# Patient Record
Sex: FEMALE | NOT HISPANIC OR LATINO | URBAN - METROPOLITAN AREA
[De-identification: names, ages, dates, MRNs, and addresses within clinical notes are randomized per-mention and may not be internally consistent; named-entity substitution may affect disease eponyms.]

---

## 2018-01-23 ENCOUNTER — INPATIENT (INPATIENT)
Facility: HOSPITAL | Age: 73
LOS: 3 days | Discharge: HOME | DRG: 194 | End: 2018-01-27
Attending: FAMILY MEDICINE | Admitting: FAMILY MEDICINE
Payer: MEDICAID

## 2018-01-23 VITALS
HEIGHT: 63.5 IN | WEIGHT: 88.63 LBS | RESPIRATION RATE: 18 BRPM | TEMPERATURE: 101 F | DIASTOLIC BLOOD PRESSURE: 51 MMHG | SYSTOLIC BLOOD PRESSURE: 131 MMHG | OXYGEN SATURATION: 93 % | HEART RATE: 80 BPM

## 2018-01-23 DIAGNOSIS — N17.9 ACUTE KIDNEY FAILURE, UNSPECIFIED: ICD-10-CM

## 2018-01-23 DIAGNOSIS — J43.9 EMPHYSEMA, UNSPECIFIED: ICD-10-CM

## 2018-01-23 DIAGNOSIS — R41.82 ALTERED MENTAL STATUS, UNSPECIFIED: ICD-10-CM

## 2018-01-23 DIAGNOSIS — F32.9 MAJOR DEPRESSIVE DISORDER, SINGLE EPISODE, UNSPECIFIED: ICD-10-CM

## 2018-01-23 DIAGNOSIS — R50.9 FEVER, UNSPECIFIED: ICD-10-CM

## 2018-01-23 DIAGNOSIS — J10.1 INFLUENZA DUE TO OTHER IDENTIFIED INFLUENZA VIRUS WITH OTHER RESPIRATORY MANIFESTATIONS: ICD-10-CM

## 2018-01-23 DIAGNOSIS — R91.1 SOLITARY PULMONARY NODULE: ICD-10-CM

## 2018-01-23 DIAGNOSIS — I10 ESSENTIAL (PRIMARY) HYPERTENSION: ICD-10-CM
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
## 2018-01-23 DIAGNOSIS — R54 AGE-RELATED PHYSICAL DEBILITY: ICD-10-CM

## 2018-01-23 DIAGNOSIS — A41.9 SEPSIS, UNSPECIFIED ORGANISM: ICD-10-CM

## 2018-01-23 LAB
ALBUMIN SERPL ELPH-MCNC: 2.8 G/DL — LOW (ref 3.3–5)
ALP SERPL-CCNC: 36 U/L — SIGNIFICANT CHANGE UP (ref 30–120)
ALT FLD-CCNC: 46 U/L DA — SIGNIFICANT CHANGE UP (ref 10–60)
ANION GAP SERPL CALC-SCNC: 6 MMOL/L — SIGNIFICANT CHANGE UP (ref 5–17)
APPEARANCE UR: CLEAR — SIGNIFICANT CHANGE UP
AST SERPL-CCNC: 74 U/L — HIGH (ref 10–40)
BASOPHILS # BLD AUTO: 0 K/UL — SIGNIFICANT CHANGE UP (ref 0–0.2)
BASOPHILS NFR BLD AUTO: 0.3 % — SIGNIFICANT CHANGE UP (ref 0–2)
BILIRUB SERPL-MCNC: 0.4 MG/DL — SIGNIFICANT CHANGE UP (ref 0.2–1.2)
BILIRUB UR-MCNC: NEGATIVE — SIGNIFICANT CHANGE UP
BUN SERPL-MCNC: 28 MG/DL — HIGH (ref 7–23)
CALCIUM SERPL-MCNC: 8.2 MG/DL — LOW (ref 8.4–10.5)
CHLORIDE SERPL-SCNC: 104 MMOL/L — SIGNIFICANT CHANGE UP (ref 96–108)
CO2 SERPL-SCNC: 30 MMOL/L — SIGNIFICANT CHANGE UP (ref 22–31)
COLOR SPEC: YELLOW — SIGNIFICANT CHANGE UP
CREAT SERPL-MCNC: 1.36 MG/DL — HIGH (ref 0.5–1.3)
DIFF PNL FLD: ABNORMAL
EOSINOPHIL # BLD AUTO: 0 K/UL — SIGNIFICANT CHANGE UP (ref 0–0.5)
EOSINOPHIL NFR BLD AUTO: 0 % — SIGNIFICANT CHANGE UP (ref 0–6)
FLUAV SPEC QL CULT: NEGATIVE — SIGNIFICANT CHANGE UP
FLUBV AG SPEC QL IA: NEGATIVE — SIGNIFICANT CHANGE UP
GLUCOSE SERPL-MCNC: 141 MG/DL — HIGH (ref 70–99)
GLUCOSE UR QL: NEGATIVE MG/DL — SIGNIFICANT CHANGE UP
HCT VFR BLD CALC: 32.7 % — LOW (ref 34.5–45)
HGB BLD-MCNC: 11 G/DL — LOW (ref 11.5–15.5)
KETONES UR-MCNC: NEGATIVE — SIGNIFICANT CHANGE UP
LACTATE SERPL-SCNC: 1 MMOL/L — SIGNIFICANT CHANGE UP (ref 0.7–2)
LACTATE SERPL-SCNC: 2.5 MMOL/L — HIGH (ref 0.7–2)
LEUKOCYTE ESTERASE UR-ACNC: NEGATIVE — SIGNIFICANT CHANGE UP
LYMPHOCYTES # BLD AUTO: 0.9 K/UL — LOW (ref 1–3.3)
LYMPHOCYTES # BLD AUTO: 7.3 % — LOW (ref 13–44)
MCHC RBC-ENTMCNC: 31.4 PG — SIGNIFICANT CHANGE UP (ref 27–34)
MCHC RBC-ENTMCNC: 33.7 GM/DL — SIGNIFICANT CHANGE UP (ref 32–36)
MCV RBC AUTO: 93.2 FL — SIGNIFICANT CHANGE UP (ref 80–100)
MONOCYTES # BLD AUTO: 0.6 K/UL — SIGNIFICANT CHANGE UP (ref 0–0.9)
MONOCYTES NFR BLD AUTO: 4.6 % — SIGNIFICANT CHANGE UP (ref 2–14)
NEUTROPHILS # BLD AUTO: 10.6 K/UL — HIGH (ref 1.8–7.4)
NEUTROPHILS NFR BLD AUTO: 87.8 % — HIGH (ref 43–77)
NITRITE UR-MCNC: NEGATIVE — SIGNIFICANT CHANGE UP
PH UR: 5 — SIGNIFICANT CHANGE UP (ref 5–8)
PLATELET # BLD AUTO: 151 K/UL — SIGNIFICANT CHANGE UP (ref 150–400)
POTASSIUM SERPL-MCNC: 4.1 MMOL/L — SIGNIFICANT CHANGE UP (ref 3.5–5.3)
POTASSIUM SERPL-SCNC: 4.1 MMOL/L — SIGNIFICANT CHANGE UP (ref 3.5–5.3)
PROT SERPL-MCNC: 7.2 G/DL — SIGNIFICANT CHANGE UP (ref 6–8.3)
PROT UR-MCNC: 100 MG/DL
RBC # BLD: 3.5 M/UL — LOW (ref 3.8–5.2)
RBC # FLD: 11.8 % — SIGNIFICANT CHANGE UP (ref 10.3–14.5)
SODIUM SERPL-SCNC: 140 MMOL/L — SIGNIFICANT CHANGE UP (ref 135–145)
SP GR SPEC: 1.02 — SIGNIFICANT CHANGE UP (ref 1.01–1.02)
UROBILINOGEN FLD QL: NEGATIVE MG/DL — SIGNIFICANT CHANGE UP
WBC # BLD: 12 K/UL — HIGH (ref 3.8–10.5)
WBC # FLD AUTO: 12 K/UL — HIGH (ref 3.8–10.5)

## 2018-01-23 PROCEDURE — 93010 ELECTROCARDIOGRAM REPORT: CPT

## 2018-01-23 PROCEDURE — 99223 1ST HOSP IP/OBS HIGH 75: CPT

## 2018-01-23 PROCEDURE — 70450 CT HEAD/BRAIN W/O DYE: CPT | Mod: 26

## 2018-01-23 PROCEDURE — 71045 X-RAY EXAM CHEST 1 VIEW: CPT | Mod: 26

## 2018-01-23 PROCEDURE — 99285 EMERGENCY DEPT VISIT HI MDM: CPT

## 2018-01-23 PROCEDURE — 71250 CT THORAX DX C-: CPT | Mod: 26

## 2018-01-23 RX ORDER — ATENOLOL 25 MG/1
50 TABLET ORAL AT BEDTIME
Qty: 0 | Refills: 0 | Status: DISCONTINUED | OUTPATIENT
Start: 2018-01-23 | End: 2018-01-27

## 2018-01-23 RX ORDER — FOLIC ACID 0.8 MG
1 TABLET ORAL DAILY
Qty: 0 | Refills: 0 | Status: DISCONTINUED | OUTPATIENT
Start: 2018-01-23 | End: 2018-01-27

## 2018-01-23 RX ORDER — DOCUSATE SODIUM 100 MG
100 CAPSULE ORAL THREE TIMES A DAY
Qty: 0 | Refills: 0 | Status: DISCONTINUED | OUTPATIENT
Start: 2018-01-23 | End: 2018-01-27

## 2018-01-23 RX ORDER — MIRTAZAPINE 45 MG/1
22.5 TABLET, ORALLY DISINTEGRATING ORAL
Qty: 0 | Refills: 0 | COMMUNITY

## 2018-01-23 RX ORDER — ONDANSETRON 8 MG/1
4 TABLET, FILM COATED ORAL EVERY 6 HOURS
Qty: 0 | Refills: 0 | Status: DISCONTINUED | OUTPATIENT
Start: 2018-01-23 | End: 2018-01-27

## 2018-01-23 RX ORDER — ESCITALOPRAM OXALATE 10 MG/1
5 TABLET, FILM COATED ORAL DAILY
Qty: 0 | Refills: 0 | Status: DISCONTINUED | OUTPATIENT
Start: 2018-01-23 | End: 2018-01-23

## 2018-01-23 RX ORDER — ACETAMINOPHEN 500 MG
650 TABLET ORAL ONCE
Qty: 0 | Refills: 0 | Status: COMPLETED | OUTPATIENT
Start: 2018-01-23 | End: 2018-01-23

## 2018-01-23 RX ORDER — SODIUM CHLORIDE 9 MG/ML
1000 INJECTION INTRAMUSCULAR; INTRAVENOUS; SUBCUTANEOUS ONCE
Qty: 0 | Refills: 0 | Status: COMPLETED | OUTPATIENT
Start: 2018-01-23 | End: 2018-01-23

## 2018-01-23 RX ORDER — SODIUM CHLORIDE 9 MG/ML
1000 INJECTION, SOLUTION INTRAVENOUS
Qty: 0 | Refills: 0 | Status: DISCONTINUED | OUTPATIENT
Start: 2018-01-23 | End: 2018-01-24

## 2018-01-23 RX ORDER — MIRTAZAPINE 45 MG/1
7.5 TABLET, ORALLY DISINTEGRATING ORAL AT BEDTIME
Qty: 0 | Refills: 0 | Status: DISCONTINUED | OUTPATIENT
Start: 2018-01-23 | End: 2018-01-23

## 2018-01-23 RX ORDER — ESCITALOPRAM OXALATE 10 MG/1
15 TABLET, FILM COATED ORAL DAILY
Qty: 0 | Refills: 0 | Status: DISCONTINUED | OUTPATIENT
Start: 2018-01-23 | End: 2018-01-27

## 2018-01-23 RX ORDER — MIRTAZAPINE 45 MG/1
22.5 TABLET, ORALLY DISINTEGRATING ORAL AT BEDTIME
Qty: 0 | Refills: 0 | Status: DISCONTINUED | OUTPATIENT
Start: 2018-01-23 | End: 2018-01-27

## 2018-01-23 RX ORDER — SODIUM CHLORIDE 9 MG/ML
1000 INJECTION INTRAMUSCULAR; INTRAVENOUS; SUBCUTANEOUS
Qty: 0 | Refills: 0 | Status: DISCONTINUED | OUTPATIENT
Start: 2018-01-23 | End: 2018-01-23

## 2018-01-23 RX ORDER — LOSARTAN POTASSIUM 100 MG/1
1 TABLET, FILM COATED ORAL
Qty: 0 | Refills: 0 | COMMUNITY

## 2018-01-23 RX ORDER — IPRATROPIUM/ALBUTEROL SULFATE 18-103MCG
3 AEROSOL WITH ADAPTER (GRAM) INHALATION EVERY 6 HOURS
Qty: 0 | Refills: 0 | Status: DISCONTINUED | OUTPATIENT
Start: 2018-01-23 | End: 2018-01-23

## 2018-01-23 RX ORDER — FOLIC ACID 0.8 MG
1 TABLET ORAL
Qty: 0 | Refills: 0 | COMMUNITY

## 2018-01-23 RX ORDER — ALBUTEROL 90 UG/1
2.5 AEROSOL, METERED ORAL EVERY 12 HOURS
Qty: 0 | Refills: 0 | Status: DISCONTINUED | OUTPATIENT
Start: 2018-01-23 | End: 2018-01-27

## 2018-01-23 RX ORDER — INFLUENZA VIRUS VACCINE 15; 15; 15; 15 UG/.5ML; UG/.5ML; UG/.5ML; UG/.5ML
0.5 SUSPENSION INTRAMUSCULAR ONCE
Qty: 0 | Refills: 0 | Status: COMPLETED | OUTPATIENT
Start: 2018-01-23 | End: 2018-01-23

## 2018-01-23 RX ORDER — ATENOLOL 25 MG/1
1 TABLET ORAL
Qty: 0 | Refills: 0 | COMMUNITY

## 2018-01-23 RX ORDER — LACTOBACILLUS ACIDOPHILUS 100MM CELL
1 CAPSULE ORAL
Qty: 0 | Refills: 0 | Status: DISCONTINUED | OUTPATIENT
Start: 2018-01-23 | End: 2018-01-27

## 2018-01-23 RX ORDER — ESCITALOPRAM OXALATE 10 MG/1
15 TABLET, FILM COATED ORAL
Qty: 0 | Refills: 0 | COMMUNITY

## 2018-01-23 RX ORDER — LOSARTAN POTASSIUM 100 MG/1
25 TABLET, FILM COATED ORAL DAILY
Qty: 0 | Refills: 0 | Status: DISCONTINUED | OUTPATIENT
Start: 2018-01-23 | End: 2018-01-27

## 2018-01-23 RX ORDER — HEPARIN SODIUM 5000 [USP'U]/ML
5000 INJECTION INTRAVENOUS; SUBCUTANEOUS EVERY 12 HOURS
Qty: 0 | Refills: 0 | Status: DISCONTINUED | OUTPATIENT
Start: 2018-01-23 | End: 2018-01-27

## 2018-01-23 RX ADMIN — Medication 600 MILLIGRAM(S): at 18:52

## 2018-01-23 RX ADMIN — ATENOLOL 50 MILLIGRAM(S): 25 TABLET ORAL at 21:01

## 2018-01-23 RX ADMIN — Medication 650 MILLIGRAM(S): at 13:48

## 2018-01-23 RX ADMIN — SODIUM CHLORIDE 1000 MILLILITER(S): 9 INJECTION INTRAMUSCULAR; INTRAVENOUS; SUBCUTANEOUS at 14:31

## 2018-01-23 RX ADMIN — SODIUM CHLORIDE 1000 MILLILITER(S): 9 INJECTION INTRAMUSCULAR; INTRAVENOUS; SUBCUTANEOUS at 13:56

## 2018-01-23 RX ADMIN — HEPARIN SODIUM 5000 UNIT(S): 5000 INJECTION INTRAVENOUS; SUBCUTANEOUS at 18:52

## 2018-01-23 RX ADMIN — SODIUM CHLORIDE 1000 MILLILITER(S): 9 INJECTION INTRAMUSCULAR; INTRAVENOUS; SUBCUTANEOUS at 13:39

## 2018-01-23 RX ADMIN — ALBUTEROL 2.5 MILLIGRAM(S): 90 AEROSOL, METERED ORAL at 19:45

## 2018-01-23 RX ADMIN — MIRTAZAPINE 22.5 MILLIGRAM(S): 45 TABLET, ORALLY DISINTEGRATING ORAL at 21:31

## 2018-01-23 RX ADMIN — SODIUM CHLORIDE 1000 MILLILITER(S): 9 INJECTION INTRAMUSCULAR; INTRAVENOUS; SUBCUTANEOUS at 13:41

## 2018-01-23 RX ADMIN — SODIUM CHLORIDE 80 MILLILITER(S): 9 INJECTION, SOLUTION INTRAVENOUS at 16:58

## 2018-01-23 RX ADMIN — Medication 30 MILLIGRAM(S): at 18:52

## 2018-01-23 NOTE — ED PROVIDER NOTE - CHPI ED SYMPTOMS NEG
no confusion/no loss of consciousness/no tingling/no fever/no numbness/no weakness/no vomiting/no deformity/no bleeding

## 2018-01-23 NOTE — CONSULT NOTE ADULT - PROBLEM SELECTOR RECOMMENDATION 2
pulm Nodule  suspicious for malignancy   will check CEA  may need PET scan and or repeat CT scan as outpatient  age appropriate cancer screen

## 2018-01-23 NOTE — ED PROVIDER NOTE - OBJECTIVE STATEMENT
72 year old female with a PMHx of depression and anxiety c/o fall, weakness x 4 days.  Daughter states she fell 4 days ago and today.  PT is weak and unstable on her feet.  She hit her head both times, small abrasion to her scalp.  Family denies LOC.  Pt has been coughing for the past week, daughter states the whole family has been sick with an URI.  PT had subjective fevers at home and chills.  Denies n/v/d/c, SOB, abdominal pain, extremity pain, chest pain, recent travel.  PMD: Dr. Vera

## 2018-01-23 NOTE — ED PROVIDER NOTE - MEDICAL DECISION MAKING DETAILS
pt with weakness mult falls past 4 days, fever, mult family members with uri's - ekg/xr/labs/ct/ivf/abx/tylenol pt with weakness mult falls past 4 days, fever, mult family members with uri's - ekg/xr/labs/ct/ivf/abx/tylenol  admit to hospitalist for sepsis

## 2018-01-23 NOTE — H&P ADULT - PROBLEM SELECTOR PLAN 1
Unclear Etiology.  Possible URI.  Will obtain BC/UA/UC, chest CT scan, and Monitor Clinical Pic.  Might consider ID consult

## 2018-01-23 NOTE — H&P ADULT - RS GEN PE MLT RESP DETAILS PC
diminished breath sounds, R/good air movement/breath sounds equal/rhonchi/airway patent/diminished breath sounds, L

## 2018-01-23 NOTE — SWALLOW BEDSIDE ASSESSMENT ADULT - COMMENTS
The patient is a 72 year old female, A&Ox1, and currently NPO. The patients daughter was present and expressed that the patient is tolerating a soft textured diet with thin liquids at home. She also noted that the patient has had a change in speech since her fall, specifically in production of words. During this evaluation, the patient trialed puree, soft, and regular solids with thin liquids. Adequate oral prep and A-P transport, timely swallow trigger with hyolaryngeal excursion. No overt s/s penetration/aspiration noted. Patients daughter requesting Dysphagia 3 Soft textures, per patient preference.

## 2018-01-23 NOTE — H&P ADULT - HISTORY OF PRESENT ILLNESS
Patient is 73 yo female with hx of depression, and HTN presenting with productive cough with green sputum, mild sob, and URI symptoms that has been going on for the past several days, and progressively worsen.  As per Aid, patient fell on Friday when she was getting off the bed, and Yesterday after having BM.  No LOC.    [place], but unable to obtain medical care since then due to insurance Issues.  As per family, patient has been having issues with memory issue, abnormal gait, and weight lost that has been going on for a while    Patient appears to be confused.  Unable to obtain HX or ROS

## 2018-01-23 NOTE — H&P ADULT - ATTENDING COMMENTS
Plan of care was discussed with patient, and daughter in great details, All questions were answered to their satisfaction.  Seems to understand, and in agreement

## 2018-01-23 NOTE — ED PROVIDER NOTE - PROGRESS NOTE DETAILS
Pt is being admitted for sepsis, discussed with the family.  Spoke to Dr. Reed, will admit to hospitalist.

## 2018-01-23 NOTE — ED ADULT NURSE NOTE - CHIEF COMPLAINT QUOTE
"She fell 4 days ago and again last night." Low back pain and hit her head last night. Brought in by daughter. Patient also has chest congestion for a week dn feels weak.

## 2018-01-23 NOTE — CONSULT NOTE ADULT - PROBLEM SELECTOR RECOMMENDATION 5
COPD  emphysema on CT chest  NEBS bid  mucinex  Tamiflu for FLU illness  prognosis guarded  keep sat > 88 pct  will follow

## 2018-01-23 NOTE — H&P ADULT - PROBLEM SELECTOR PLAN 4
unclear Etiology.  Head CT scan shows no acute process.  Will obtain UA/UC/Chest CT scan to rule out infection.  Will also obtain TSH level, B12, folate level, and neurology consult.  Continue with neuro check  NPO until further evaluation by Speech therapy unclear Etiology.  Head CT scan shows no acute process.  Will obtain UA/UC/Chest CT scan to rule out infection.  Will also obtain TSH level, RPR, B12, folate level, and neurology consult.  Continue with neuro check  NPO until further evaluation by Speech therapy

## 2018-01-23 NOTE — ED PROVIDER NOTE - PHYSICAL EXAMINATION
NO ABRASION NOTED TO THE SCALP  ABRASION TO IVÁN ELBOWS   SMALL AREA OF ECCHYMOSES TO THE RIGHT HELIX

## 2018-01-23 NOTE — ED ADULT NURSE NOTE - CHPI ED SYMPTOMS NEG
no chest pain/no diaphoresis/no chills/no body aches/no headache/no hemoptysis/no shortness of breath/no wheezing/no edema

## 2018-01-23 NOTE — ED ADULT TRIAGE NOTE - CHIEF COMPLAINT QUOTE
"She fell 4 days ago and again last night." Low back pain and hit her head last night. Brought in by daughter. Patient also has chest congestion. "She fell 4 days ago and again last night." Low back pain and hit her head last night. Brought in by daughter. Patient also has chest congestion for a weeka dn feels weak.

## 2018-01-23 NOTE — ED PROVIDER NOTE - ATTENDING CONTRIBUTION TO CARE
pt c/o 4 days of generalized weakness, mult falls, hit head. no loc, neck or back pain, arm or leg pain, cp, sob, abd pain, weakness, numbness. + cough.  mcat, perrl, mmm, s1s2 rrr, lungs cta b/l, abd soft, nt, neck and back nt, ext nt, full rom, no focal motor or sensory deficit

## 2018-01-24 DIAGNOSIS — R91.1 SOLITARY PULMONARY NODULE: ICD-10-CM

## 2018-01-24 LAB
ANION GAP SERPL CALC-SCNC: 8 MMOL/L — SIGNIFICANT CHANGE UP (ref 5–17)
BUN SERPL-MCNC: 14 MG/DL — SIGNIFICANT CHANGE UP (ref 7–23)
CALCIUM SERPL-MCNC: 7.6 MG/DL — LOW (ref 8.4–10.5)
CEA SERPL-MCNC: 2.8 NG/ML — SIGNIFICANT CHANGE UP (ref 0–3.8)
CHLORIDE SERPL-SCNC: 105 MMOL/L — SIGNIFICANT CHANGE UP (ref 96–108)
CO2 SERPL-SCNC: 25 MMOL/L — SIGNIFICANT CHANGE UP (ref 22–31)
CREAT SERPL-MCNC: 0.91 MG/DL — SIGNIFICANT CHANGE UP (ref 0.5–1.3)
FOLATE SERPL-MCNC: >20 NG/ML — SIGNIFICANT CHANGE UP (ref 4.8–24.2)
GLUCOSE SERPL-MCNC: 133 MG/DL — HIGH (ref 70–99)
HCT VFR BLD CALC: 31.4 % — LOW (ref 34.5–45)
HGB BLD-MCNC: 10.7 G/DL — LOW (ref 11.5–15.5)
LDH SERPL L TO P-CCNC: 620 U/L — HIGH (ref 50–242)
MCHC RBC-ENTMCNC: 31.7 PG — SIGNIFICANT CHANGE UP (ref 27–34)
MCHC RBC-ENTMCNC: 34.2 GM/DL — SIGNIFICANT CHANGE UP (ref 32–36)
MCV RBC AUTO: 92.8 FL — SIGNIFICANT CHANGE UP (ref 80–100)
PLATELET # BLD AUTO: 141 K/UL — LOW (ref 150–400)
POTASSIUM SERPL-MCNC: 3.8 MMOL/L — SIGNIFICANT CHANGE UP (ref 3.5–5.3)
POTASSIUM SERPL-SCNC: 3.8 MMOL/L — SIGNIFICANT CHANGE UP (ref 3.5–5.3)
RBC # BLD: 3.38 M/UL — LOW (ref 3.8–5.2)
RBC # FLD: 11.7 % — SIGNIFICANT CHANGE UP (ref 10.3–14.5)
SODIUM SERPL-SCNC: 138 MMOL/L — SIGNIFICANT CHANGE UP (ref 135–145)
T PALLIDUM AB TITR SER: NEGATIVE — SIGNIFICANT CHANGE UP
TSH SERPL-MCNC: 2.33 UIU/ML — SIGNIFICANT CHANGE UP (ref 0.27–4.2)
VIT B12 SERPL-MCNC: 1415 PG/ML — HIGH (ref 232–1245)
WBC # BLD: 10.4 K/UL — SIGNIFICANT CHANGE UP (ref 3.8–10.5)
WBC # FLD AUTO: 10.4 K/UL — SIGNIFICANT CHANGE UP (ref 3.8–10.5)

## 2018-01-24 PROCEDURE — 99233 SBSQ HOSP IP/OBS HIGH 50: CPT

## 2018-01-24 RX ORDER — ACETAMINOPHEN 500 MG
650 TABLET ORAL EVERY 6 HOURS
Qty: 0 | Refills: 0 | Status: COMPLETED | OUTPATIENT
Start: 2018-01-24 | End: 2018-01-24

## 2018-01-24 RX ORDER — AMLODIPINE BESYLATE 2.5 MG/1
5 TABLET ORAL ONCE
Qty: 0 | Refills: 0 | Status: COMPLETED | OUTPATIENT
Start: 2018-01-24 | End: 2018-01-24

## 2018-01-24 RX ORDER — TAMSULOSIN HYDROCHLORIDE 0.4 MG/1
0.4 CAPSULE ORAL AT BEDTIME
Qty: 0 | Refills: 0 | Status: DISCONTINUED | OUTPATIENT
Start: 2018-01-24 | End: 2018-01-27

## 2018-01-24 RX ORDER — POLYETHYLENE GLYCOL 3350 17 G/17G
17 POWDER, FOR SOLUTION ORAL DAILY
Qty: 0 | Refills: 0 | Status: DISCONTINUED | OUTPATIENT
Start: 2018-01-24 | End: 2018-01-27

## 2018-01-24 RX ADMIN — Medication 650 MILLIGRAM(S): at 16:59

## 2018-01-24 RX ADMIN — Medication 1 TABLET(S): at 08:15

## 2018-01-24 RX ADMIN — TAMSULOSIN HYDROCHLORIDE 0.4 MILLIGRAM(S): 0.4 CAPSULE ORAL at 18:02

## 2018-01-24 RX ADMIN — Medication 1 TABLET(S): at 18:02

## 2018-01-24 RX ADMIN — ALBUTEROL 2.5 MILLIGRAM(S): 90 AEROSOL, METERED ORAL at 19:31

## 2018-01-24 RX ADMIN — HEPARIN SODIUM 5000 UNIT(S): 5000 INJECTION INTRAVENOUS; SUBCUTANEOUS at 18:03

## 2018-01-24 RX ADMIN — MIRTAZAPINE 22.5 MILLIGRAM(S): 45 TABLET, ORALLY DISINTEGRATING ORAL at 18:02

## 2018-01-24 RX ADMIN — Medication 1 MILLIGRAM(S): at 11:45

## 2018-01-24 RX ADMIN — ATENOLOL 50 MILLIGRAM(S): 25 TABLET ORAL at 18:02

## 2018-01-24 RX ADMIN — ALBUTEROL 2.5 MILLIGRAM(S): 90 AEROSOL, METERED ORAL at 07:46

## 2018-01-24 RX ADMIN — Medication 600 MILLIGRAM(S): at 08:15

## 2018-01-24 RX ADMIN — LOSARTAN POTASSIUM 25 MILLIGRAM(S): 100 TABLET, FILM COATED ORAL at 05:29

## 2018-01-24 RX ADMIN — Medication 30 MILLIGRAM(S): at 18:02

## 2018-01-24 RX ADMIN — HEPARIN SODIUM 5000 UNIT(S): 5000 INJECTION INTRAVENOUS; SUBCUTANEOUS at 05:29

## 2018-01-24 RX ADMIN — ESCITALOPRAM OXALATE 15 MILLIGRAM(S): 10 TABLET, FILM COATED ORAL at 08:15

## 2018-01-24 RX ADMIN — Medication 600 MILLIGRAM(S): at 18:02

## 2018-01-24 RX ADMIN — Medication 1 TABLET(S): at 11:45

## 2018-01-24 RX ADMIN — Medication 30 MILLIGRAM(S): at 08:15

## 2018-01-24 NOTE — GOALS OF CARE CONVERSATION - PERSONAL ADVANCE DIRECTIVE - CONVERSATION DETAILS
Spoke with dtr /surrogate about resuscitation.She has no previous information regarding this issue.She will make those determinations as her condition warrants.

## 2018-01-24 NOTE — CONSULT NOTE ADULT - ASSESSMENT
fall mechanical   memory issues possible mci / slight dementia check tsh b12 folate  dysarthria decrease left NLF check mri   if mri normal cleared by neurology

## 2018-01-24 NOTE — DIETITIAN INITIAL EVALUATION ADULT. - PERTINENT MEDS FT
heparin, tamiflu, polyethylene glycol, colace, flomax, remeron, cozaar, lexapro, mucinex, atenolol, folic acid, zofran

## 2018-01-24 NOTE — CONSULT NOTE ADULT - SUBJECTIVE AND OBJECTIVE BOX
Date/Time Patient Seen:  		  Referring MD:   Data Reviewed	       Patient is a 72y old  Female who presents with a chief complaint of Fall/cough (23 Jan 2018 15:42)      Subjective/HPI    in bed  seen and examined  vs and meds reviewed  pt is a poor historian  discussed hx with aide and reviewed H and P and ER provider note  lives at home  positive sick contacts reported    Patient is 71 yo female with hx of depression, and HTN presenting with productive cough with green sputum, mild sob, and URI symptoms that has been going on for the past several days, and progressively worsen.  As per Aid, patient fell on Friday when she was getting off the bed, and Yesterday after having BM.  No LOC.    [place], but unable to obtain medical care since then due to insurance Issues.  As per family, patient has been having issues with memory issue, abnormal gait, and weight lost that has been going on for a while    Patient appears to be confused.  Unable to obtain HX or ROS     PAST MEDICAL & SURGICAL HISTORY:  Essential hypertension  Anxiety  Depression  No significant past surgical history        Medication list         MEDICATIONS  (STANDING):  ALBUTerol    0.083% 2.5 milliGRAM(s) Nebulizer every 12 hours  ATENolol  Tablet 50 milliGRAM(s) Oral at bedtime  dextrose 5% + sodium chloride 0.45%. 1000 milliLiter(s) (80 mL/Hr) IV Continuous <Continuous>  docusate sodium 100 milliGRAM(s) Oral three times a day  escitalopram 5 milliGRAM(s) Oral daily  folic acid 1 milliGRAM(s) Oral daily  guaiFENesin  milliGRAM(s) Oral every 12 hours  heparin  Injectable 5000 Unit(s) SubCutaneous every 12 hours  mirtazapine Soltab 7.5 milliGRAM(s) Oral at bedtime  oseltamivir 30 milliGRAM(s) Oral two times a day    MEDICATIONS  (PRN):  ondansetron Injectable 4 milliGRAM(s) IV Push every 6 hours PRN Nausea         Vitals log        ICU Vital Signs Last 24 Hrs  T(C): 38 (23 Jan 2018 17:19), Max: 38.4 (23 Jan 2018 12:15)  T(F): 100.4 (23 Jan 2018 17:19), Max: 101.1 (23 Jan 2018 12:15)  HR: 77 (23 Jan 2018 17:19) (77 - 82)  BP: 142/60 (23 Jan 2018 17:19) (130/55 - 142/60)  BP(mean): 77 (23 Jan 2018 15:42) (77 - 77)  ABP: --  ABP(mean): --  RR: 17 (23 Jan 2018 17:19) (17 - 18)  SpO2: 95% (23 Jan 2018 17:19) (93% - 95%)           Input and Output:  I&O's Detail      Lab Data                        11.0   12.0  )-----------( 151      ( 23 Jan 2018 13:30 )             32.7     01-23    140  |  104  |  28<H>  ----------------------------<  141<H>  4.1   |  30  |  1.36<H>    Ca    8.2<L>      23 Jan 2018 13:30    TPro  7.2  /  Alb  2.8<L>  /  TBili  0.4  /  DBili  x   /  AST  74<H>  /  ALT  46  /  AlkPhos  36  01-23            Review of Systems	  weak  depressed  flat affect      Objective     Physical Examination  frail  weak  head at  heart s1s2  lungs dec BS  abd soft        Pertinent Lab findings & Imaging      Olmos:  NO   Adequate UO     I&O's Detail           Discussed with:     Cultures:	        Radiology          EXAM:  CT CHEST                                  PROCEDURE DATE:  01/23/2018          INTERPRETATION:  No prior studies present for comparison    Clinical information: Fever and cough    Axial images obtained, coronal and sagittal images computer reformatted.   Noncontrast study limits evaluation of hilar and mediastinal regions.    2.2 cm mass left upper lobe , medially situated, cannot exclude neoplasm.  Advise follow-up evaluation. Consider follow-up PET scan.    12 mm nodule is present in the right upper lobe, cannot exclude neoplasm.    There are tree-in-bud opacities in the right upper lobe, right middle   lobe, and right lower lobe, findings may be on the basis of an    infectious etiology.    Vague groundglass opacities left upper lobe, left lower lobe. Minimal   bullous changes left lower lobe. No pneumothorax. Trace effusions.    No pericardial effusion or thoracic aortic aneurysm. Central airway   intact. Thyroid gland not enlarged. No adrenal lesions. The spleen is not   enlarged. No acute osseous abnormalities. Small hiatus hernia present.   Artery calcifications present.    IMPRESSION: Suspicious left upper lobe lesion, could be neoplastic. Right   upper lobe nodule.    Airspace disease, right lung .    See additional findings as described above.                             ALEXEI MATIAS M.D.,ATTENDING RADIOLOGIST  This document has been electronically signed. Jan 23 2018  4:47PM
.

## 2018-01-24 NOTE — DIETITIAN INITIAL EVALUATION ADULT. - NUTRITION INTERVENTIONS
nutrient dense snacks/food preferences as requested by patient (specify)/Halal/Kosher foods preferred

## 2018-01-24 NOTE — DIETITIAN INITIAL EVALUATION ADULT. - NS AS NUTRI INTERV MEALS SNACK
Texture-modified diet/Diets modified for specific foods and ingredients/General/healthful diet/Halal/Kosher meats only

## 2018-01-24 NOTE — PROGRESS NOTE ADULT - PROBLEM SELECTOR PLAN 7
Outpatient PET scan VS.  repeat Chest CT scan. Outpatient PET scan VS.  repeat Chest CT scan.  Urinary retention.  Straight cath PRN.  Flomax

## 2018-01-24 NOTE — DIETITIAN INITIAL EVALUATION ADULT. - OTHER INFO
71 yo female admit for +Flu; referred for weight loss; however, per dtr's report, pt will planned wt gain of ~12# X 1 year, since pt moved in with dtr; pt is A & O x 1 and dtr cares for pt; pt is "picky" eater per dtr report and eats the same foods daily; preferences reviewed; seen by SLP 1/23 with diet rec of soft consistency diet with thin liquids; DASH/TLC diet  does not appear warranted, so request to be made to D/C the same; Also, pt consumes Ensure @ home, so request to be made for Ensure Clear 8 oz BID for addtl nutrients; requires assistance with meals; impaired skin integrity notes - mult abrasions due to falls; PMH to follow:

## 2018-01-24 NOTE — DIETITIAN INITIAL EVALUATION ADULT. - PROBLEM SELECTOR PLAN 4
unclear Etiology.  Head CT scan shows no acute process.  Will obtain UA/UC/Chest CT scan to rule out infection.  Will also obtain TSH level, RPR, B12, folate level, and neurology consult.  Continue with neuro check  NPO until further evaluation by Speech therapy

## 2018-01-24 NOTE — DIETITIAN INITIAL EVALUATION ADULT. - FACTORS AFF FOOD INTAKE
difficulty chewing/persistent lack of appetite/Dtr prepares all meals/snacks/Judaism/ethnic/cultural/personal food preferences

## 2018-01-25 ENCOUNTER — OUTPATIENT (OUTPATIENT)
Dept: OUTPATIENT SERVICES | Facility: HOSPITAL | Age: 73
LOS: 1 days | End: 2018-01-25
Payer: COMMERCIAL

## 2018-01-25 DIAGNOSIS — R33.9 RETENTION OF URINE, UNSPECIFIED: ICD-10-CM

## 2018-01-25 DIAGNOSIS — A41.9 SEPSIS, UNSPECIFIED ORGANISM: ICD-10-CM

## 2018-01-25 PROBLEM — F41.9 ANXIETY DISORDER, UNSPECIFIED: Chronic | Status: ACTIVE | Noted: 2018-01-23

## 2018-01-25 PROBLEM — F32.9 MAJOR DEPRESSIVE DISORDER, SINGLE EPISODE, UNSPECIFIED: Chronic | Status: ACTIVE | Noted: 2018-01-23

## 2018-01-25 LAB
ANION GAP SERPL CALC-SCNC: 6 MMOL/L — SIGNIFICANT CHANGE UP (ref 5–17)
BUN SERPL-MCNC: 12 MG/DL — SIGNIFICANT CHANGE UP (ref 7–23)
CALCIUM SERPL-MCNC: 7.6 MG/DL — LOW (ref 8.4–10.5)
CHLORIDE SERPL-SCNC: 104 MMOL/L — SIGNIFICANT CHANGE UP (ref 96–108)
CO2 SERPL-SCNC: 27 MMOL/L — SIGNIFICANT CHANGE UP (ref 22–31)
CREAT SERPL-MCNC: 0.86 MG/DL — SIGNIFICANT CHANGE UP (ref 0.5–1.3)
CULTURE RESULTS: SIGNIFICANT CHANGE UP
FOLATE SERPL-MCNC: >20 NG/ML — SIGNIFICANT CHANGE UP (ref 4.8–24.2)
GLUCOSE SERPL-MCNC: 108 MG/DL — HIGH (ref 70–99)
HCT VFR BLD CALC: 31 % — LOW (ref 34.5–45)
HGB BLD-MCNC: 10.6 G/DL — LOW (ref 11.5–15.5)
MCHC RBC-ENTMCNC: 32 PG — SIGNIFICANT CHANGE UP (ref 27–34)
MCHC RBC-ENTMCNC: 34.1 GM/DL — SIGNIFICANT CHANGE UP (ref 32–36)
MCV RBC AUTO: 93.9 FL — SIGNIFICANT CHANGE UP (ref 80–100)
PLATELET # BLD AUTO: 181 K/UL — SIGNIFICANT CHANGE UP (ref 150–400)
POTASSIUM SERPL-MCNC: 4 MMOL/L — SIGNIFICANT CHANGE UP (ref 3.5–5.3)
POTASSIUM SERPL-SCNC: 4 MMOL/L — SIGNIFICANT CHANGE UP (ref 3.5–5.3)
RBC # BLD: 3.3 M/UL — LOW (ref 3.8–5.2)
RBC # FLD: 12 % — SIGNIFICANT CHANGE UP (ref 10.3–14.5)
SODIUM SERPL-SCNC: 137 MMOL/L — SIGNIFICANT CHANGE UP (ref 135–145)
SPECIMEN SOURCE: SIGNIFICANT CHANGE UP
VIT B12 SERPL-MCNC: 1792 PG/ML — HIGH (ref 232–1245)
WBC # BLD: 11.2 K/UL — HIGH (ref 3.8–10.5)
WBC # FLD AUTO: 11.2 K/UL — HIGH (ref 3.8–10.5)

## 2018-01-25 PROCEDURE — 70544 MR ANGIOGRAPHY HEAD W/O DYE: CPT

## 2018-01-25 PROCEDURE — 70551 MRI BRAIN STEM W/O DYE: CPT | Mod: 26

## 2018-01-25 PROCEDURE — 70551 MRI BRAIN STEM W/O DYE: CPT

## 2018-01-25 PROCEDURE — 70544 MR ANGIOGRAPHY HEAD W/O DYE: CPT | Mod: 26,59

## 2018-01-25 PROCEDURE — 99233 SBSQ HOSP IP/OBS HIGH 50: CPT

## 2018-01-25 RX ADMIN — Medication 600 MILLIGRAM(S): at 08:27

## 2018-01-25 RX ADMIN — MIRTAZAPINE 22.5 MILLIGRAM(S): 45 TABLET, ORALLY DISINTEGRATING ORAL at 17:40

## 2018-01-25 RX ADMIN — ALBUTEROL 2.5 MILLIGRAM(S): 90 AEROSOL, METERED ORAL at 19:54

## 2018-01-25 RX ADMIN — Medication 1 TABLET(S): at 12:52

## 2018-01-25 RX ADMIN — Medication 1 DROP(S): at 20:46

## 2018-01-25 RX ADMIN — Medication 30 MILLIGRAM(S): at 17:40

## 2018-01-25 RX ADMIN — ESCITALOPRAM OXALATE 15 MILLIGRAM(S): 10 TABLET, FILM COATED ORAL at 08:30

## 2018-01-25 RX ADMIN — Medication 30 MILLIGRAM(S): at 08:26

## 2018-01-25 RX ADMIN — HEPARIN SODIUM 5000 UNIT(S): 5000 INJECTION INTRAVENOUS; SUBCUTANEOUS at 17:40

## 2018-01-25 RX ADMIN — Medication 1 TABLET(S): at 08:26

## 2018-01-25 RX ADMIN — Medication 1 TABLET(S): at 16:42

## 2018-01-25 RX ADMIN — ALBUTEROL 2.5 MILLIGRAM(S): 90 AEROSOL, METERED ORAL at 07:57

## 2018-01-25 RX ADMIN — Medication 600 MILLIGRAM(S): at 17:40

## 2018-01-25 RX ADMIN — ATENOLOL 50 MILLIGRAM(S): 25 TABLET ORAL at 16:41

## 2018-01-25 RX ADMIN — HEPARIN SODIUM 5000 UNIT(S): 5000 INJECTION INTRAVENOUS; SUBCUTANEOUS at 06:13

## 2018-01-25 RX ADMIN — Medication 100 MILLIGRAM(S): at 08:26

## 2018-01-25 RX ADMIN — LOSARTAN POTASSIUM 25 MILLIGRAM(S): 100 TABLET, FILM COATED ORAL at 06:13

## 2018-01-25 RX ADMIN — TAMSULOSIN HYDROCHLORIDE 0.4 MILLIGRAM(S): 0.4 CAPSULE ORAL at 19:07

## 2018-01-25 RX ADMIN — Medication 1 MILLIGRAM(S): at 12:52

## 2018-01-26 ENCOUNTER — TRANSCRIPTION ENCOUNTER (OUTPATIENT)
Age: 73
End: 2018-01-26

## 2018-01-26 LAB
ANION GAP SERPL CALC-SCNC: 5 MMOL/L — SIGNIFICANT CHANGE UP (ref 5–17)
BUN SERPL-MCNC: 14 MG/DL — SIGNIFICANT CHANGE UP (ref 7–23)
CALCIUM SERPL-MCNC: 7.7 MG/DL — LOW (ref 8.4–10.5)
CHLORIDE SERPL-SCNC: 107 MMOL/L — SIGNIFICANT CHANGE UP (ref 96–108)
CO2 SERPL-SCNC: 29 MMOL/L — SIGNIFICANT CHANGE UP (ref 22–31)
CREAT SERPL-MCNC: 0.89 MG/DL — SIGNIFICANT CHANGE UP (ref 0.5–1.3)
GLUCOSE SERPL-MCNC: 108 MG/DL — HIGH (ref 70–99)
HBA1C BLD-MCNC: 5.7 % — HIGH (ref 4–5.6)
HCT VFR BLD CALC: 30.1 % — LOW (ref 34.5–45)
HGB BLD-MCNC: 10 G/DL — LOW (ref 11.5–15.5)
MCHC RBC-ENTMCNC: 30.7 PG — SIGNIFICANT CHANGE UP (ref 27–34)
MCHC RBC-ENTMCNC: 33.1 GM/DL — SIGNIFICANT CHANGE UP (ref 32–36)
MCV RBC AUTO: 92.8 FL — SIGNIFICANT CHANGE UP (ref 80–100)
PLATELET # BLD AUTO: 225 K/UL — SIGNIFICANT CHANGE UP (ref 150–400)
POTASSIUM SERPL-MCNC: 4 MMOL/L — SIGNIFICANT CHANGE UP (ref 3.5–5.3)
POTASSIUM SERPL-SCNC: 4 MMOL/L — SIGNIFICANT CHANGE UP (ref 3.5–5.3)
RBC # BLD: 3.24 M/UL — LOW (ref 3.8–5.2)
RBC # FLD: 11.7 % — SIGNIFICANT CHANGE UP (ref 10.3–14.5)
SODIUM SERPL-SCNC: 141 MMOL/L — SIGNIFICANT CHANGE UP (ref 135–145)
WBC # BLD: 8.3 K/UL — SIGNIFICANT CHANGE UP (ref 3.8–10.5)
WBC # FLD AUTO: 8.3 K/UL — SIGNIFICANT CHANGE UP (ref 3.8–10.5)

## 2018-01-26 PROCEDURE — 99233 SBSQ HOSP IP/OBS HIGH 50: CPT

## 2018-01-26 RX ORDER — OFLOXACIN 0.3 %
1 DROPS OPHTHALMIC (EYE)
Qty: 0 | Refills: 0 | Status: DISCONTINUED | OUTPATIENT
Start: 2018-01-26 | End: 2018-01-27

## 2018-01-26 RX ORDER — ACETAMINOPHEN 500 MG
650 TABLET ORAL ONCE
Qty: 0 | Refills: 0 | Status: COMPLETED | OUTPATIENT
Start: 2018-01-26 | End: 2018-01-26

## 2018-01-26 RX ORDER — SENNA PLUS 8.6 MG/1
2 TABLET ORAL AT BEDTIME
Qty: 0 | Refills: 0 | Status: DISCONTINUED | OUTPATIENT
Start: 2018-01-26 | End: 2018-01-27

## 2018-01-26 RX ADMIN — Medication 600 MILLIGRAM(S): at 17:33

## 2018-01-26 RX ADMIN — TAMSULOSIN HYDROCHLORIDE 0.4 MILLIGRAM(S): 0.4 CAPSULE ORAL at 21:06

## 2018-01-26 RX ADMIN — Medication 600 MILLIGRAM(S): at 05:34

## 2018-01-26 RX ADMIN — ATENOLOL 50 MILLIGRAM(S): 25 TABLET ORAL at 17:33

## 2018-01-26 RX ADMIN — Medication 1 TABLET(S): at 17:33

## 2018-01-26 RX ADMIN — POLYETHYLENE GLYCOL 3350 17 GRAM(S): 17 POWDER, FOR SOLUTION ORAL at 13:28

## 2018-01-26 RX ADMIN — SENNA PLUS 2 TABLET(S): 8.6 TABLET ORAL at 21:07

## 2018-01-26 RX ADMIN — ALBUTEROL 2.5 MILLIGRAM(S): 90 AEROSOL, METERED ORAL at 20:00

## 2018-01-26 RX ADMIN — Medication 650 MILLIGRAM(S): at 01:41

## 2018-01-26 RX ADMIN — ESCITALOPRAM OXALATE 5 MILLIGRAM(S): 10 TABLET, FILM COATED ORAL at 09:02

## 2018-01-26 RX ADMIN — Medication 1 TABLET(S): at 09:00

## 2018-01-26 RX ADMIN — Medication 100 MILLIGRAM(S): at 13:27

## 2018-01-26 RX ADMIN — Medication 1 DROP(S): at 20:52

## 2018-01-26 RX ADMIN — Medication 30 MILLIGRAM(S): at 17:33

## 2018-01-26 RX ADMIN — ALBUTEROL 2.5 MILLIGRAM(S): 90 AEROSOL, METERED ORAL at 07:55

## 2018-01-26 RX ADMIN — HEPARIN SODIUM 5000 UNIT(S): 5000 INJECTION INTRAVENOUS; SUBCUTANEOUS at 05:33

## 2018-01-26 RX ADMIN — LOSARTAN POTASSIUM 25 MILLIGRAM(S): 100 TABLET, FILM COATED ORAL at 05:34

## 2018-01-26 RX ADMIN — MIRTAZAPINE 22.5 MILLIGRAM(S): 45 TABLET, ORALLY DISINTEGRATING ORAL at 21:07

## 2018-01-26 RX ADMIN — Medication 100 MILLIGRAM(S): at 21:08

## 2018-01-26 RX ADMIN — Medication 1 MILLIGRAM(S): at 13:27

## 2018-01-26 RX ADMIN — Medication 30 MILLIGRAM(S): at 05:34

## 2018-01-26 RX ADMIN — Medication 1 DROP(S): at 13:27

## 2018-01-26 RX ADMIN — HEPARIN SODIUM 5000 UNIT(S): 5000 INJECTION INTRAVENOUS; SUBCUTANEOUS at 17:35

## 2018-01-26 RX ADMIN — Medication 1 TABLET(S): at 13:27

## 2018-01-26 NOTE — DISCHARGE NOTE ADULT - HOSPITAL COURSE
Patient is 73 yo female with hx of HTN, and depression presenting with      Problem/Plan - 1:  ·  Problem: Fever.  Plan: Possible Flu/PNA.  UA ok.  Urine culture shows less than 92249 urogenital growth.  Blood culture negative.    Continue with Tamiflu, IV Levaquin, and neb tx.  Pulmonary to follow up.      Problem/Plan - 2:  ·  Problem: Essential hypertension.  Plan: Continue with atenolol, and hold losartan.  Monitor BP.      Problem/Plan - 3:  ·  Problem: Depression.  Plan: Continue with Home medications.  Stable.      Problem/Plan - 4:  ·  Problem: Altered mental status.  Plan: Possible cognitive disorder VS.  Metabolic encephalopathy.   Head CT scan shows no acute process.  Neurology recommended brain MRI which shows no acute process.  TSH level, B12, and folate level are WNL.  Negative RPR.  Neurology to follow up Continue with neuro check  Continue with Dysphagia 3 diet as per speech therapy.      Problem/Plan - 5:  ·  Problem: Fall.  Plan: PT consult.      Problem/Plan - 6:  Problem: Acute renal failure. Plan: Probably due to dehydration.  Resolved with hydration.     Problem/Plan - 7:  ·  Problem: Lung nodule.  Plan: Outpatient PET scan VS.  repeat Chest CT scan.      Problem/Plan - 8:  ·  Problem: Urinary retention.  Plan: Continue with Flomax.  D/C Olmos.  TOV today. Patient is 73 yo female with hx of HTN, and depression presenting with      Problem/Plan - 1:  ·  Problem: Fever.  Plan: Possible Flu/bronchitis.    Blood culture negative.    Continue with Tamiflu, IV Levaquin, and neb tx as needed.       Problem/Plan - 2:  ·  Problem: Essential hypertension.  Plan: Continue with atenolol, and hold losartan.  Monitor BP.      Problem/Plan - 3:  ·  Problem: Depression.  Plan: Continue with Home medications.  Stable.      Problem/Plan - 4:  ·  Problem: Altered mental status.  Plan: Possible cognitive disorder VS.  Metabolic encephalopathy.   Head CT scan shows no acute process.  Neurology recommended brain MRI which shows no acute process.  TSH level, B12, and folate level are WNL.  Negative RPR.  Neurology suggests outpatient follow up.  Continue with Dysphagia 3 diet as per speech therapy.      Problem/Plan - 5:  ·  Problem: Fall.  Plan: PT consult. monitor 24/7     Problem/Plan - 6:  Problem: Acute renal failure. Plan: Probably due to dehydration.  Resolved with hydration.     Problem/Plan - 7:  ·  Problem: Lung nodule.  Plan: Outpatient PET scan VS.  repeat Chest CT scan.      Problem/Plan - 8:  ·  Problem: Urinary retention.  Plan: Continue with Flomax.  D/C Olmos.  TOV successful.    Extensive discussion with daughter Rah about multiple medical issues.  Family will monitor 24/7 for the foreseeable future, and follow up with outpatient doctors as consistent with GOC.  I have advised them to buy life alert device.  agreeable to discharge home.

## 2018-01-26 NOTE — DISCHARGE NOTE ADULT - PLAN OF CARE
Continue with tamiflu, and levaquin soft diet with thin liquid resolved outpatient follow up with PMD for repeat CT scan VS Pet scan TOV today Continue with tamiflu, and levaquin course until 1/28 resolved with IVF TOV successful take flomax until seen by urologist

## 2018-01-26 NOTE — DISCHARGE NOTE ADULT - PROVIDER TOKENS
MILTON:'[id number]' TOKEN:'[id number]',TOKEN:'[id number]',FREE:[LAST:[patient's own],FIRST:[urologist],PHONE:[(   )    -],FAX:[(   )    -]],FREE:[LAST:[neurologist],FIRST:[patient's own],PHONE:[(   )    -],FAX:[(   )    -]]

## 2018-01-26 NOTE — DISCHARGE NOTE ADULT - CARE PLAN
Principal Discharge DX:	Fever  Goal:	Continue with tamiflu, and levaquin  Assessment and plan of treatment:	soft diet with thin liquid  Secondary Diagnosis:	Acute renal failure  Goal:	resolved  Secondary Diagnosis:	Lung nodule  Goal:	outpatient follow up with PMD for repeat CT scan VS Pet scan  Secondary Diagnosis:	Urinary retention  Goal:	TOV today Principal Discharge DX:	Fever  Goal:	Continue with tamiflu, and levaquin course until 1/28  Assessment and plan of treatment:	soft diet with thin liquid  Secondary Diagnosis:	Acute renal failure  Goal:	resolved with IVF  Secondary Diagnosis:	Lung nodule  Goal:	outpatient follow up with PMD for repeat CT scan VS Pet scan  Secondary Diagnosis:	Urinary retention  Goal:	TOV successful  Assessment and plan of treatment:	take flomax until seen by urologist

## 2018-01-26 NOTE — DISCHARGE NOTE ADULT - PATIENT PORTAL LINK FT
“You can access the [employer] Patient Portal, offered by [facility], by registering with the following website: [email or web address]”

## 2018-01-26 NOTE — DISCHARGE NOTE ADULT - MEDICATION SUMMARY - MEDICATIONS TO TAKE
I will START or STAY ON the medications listed below when I get home from the hospital:    vitamin b complex  -- 1 tab(s) by mouth once a day  -- Indication: For Supplement    magnesium taurate  -- 125 milligram(s) by mouth 2 times a day  -- Indication: For Supplement    losartan 25 mg oral tablet  -- 1 tab(s) by mouth once a day  -- Indication: For HTN    tamsulosin 0.4 mg oral capsule  -- 1 cap(s) by mouth once a day (at bedtime)  -- Indication: For Urinary retention    mirtazapine  -- 22.5 milligram(s) by mouth once a day (at bedtime)  -- Indication: For Insomnia    Lexapro  -- 15 milligram(s) by mouth once a day  -- Indication: For Depression    oseltamivir 30 mg oral capsule  -- 1 cap(s) by mouth 2 times a day  -- Indication: For Flu    atenolol 50 mg oral tablet  -- 1 tab(s) by mouth once a day (at bedtime)  -- Indication: For HTN    Ventolin HFA 90 mcg/inh inhalation aerosol  -- 2 puff(s) inhaled 4 times a day, As Needed -for shortness of breath and/or wheezing   -- For inhalation only.  It is very important that you take or use this exactly as directed.  Do not skip doses or discontinue unless directed by your doctor.  Obtain medical advice before taking any non-prescription drugs as some may affect the action of this medication.  Shake well before use.    -- Indication: For wheezing    Librax  -- 1 tab(s) by mouth 3 times a day  -- Indication: For Anxiety    senna oral tablet  -- 2 tab(s) by mouth once a day (at bedtime)  -- Indication: For constipation    docusate sodium 100 mg oral capsule  -- 1 cap(s) by mouth 3 times a day  -- Indication: For constipation    polyethylene glycol 3350 oral powder for reconstitution  -- 17 gram(s) by mouth once a day, As Needed  -- Indication: For constipation    ofloxacin 0.3% ophthalmic solution  -- 1 drop(s) to each affected eye 4 times a day for 3 days only.  -- Indication: For Possible eye infection    Levaquin 250 mg oral tablet  -- 1 tab(s) by mouth every 24 hours   -- Avoid prolonged or excessive exposure to direct and/or artificial sunlight while taking this medication.  Do not take dairy products, antacids, or iron preparations within one hour of this medication.  Finish all this medication unless otherwise directed by prescriber.  May cause drowsiness or dizziness.  Medication should be taken with plenty of water.    -- Indication: For bronchitis    folic acid 1 mg oral tablet  -- 1 tab(s) by mouth once a day  -- Indication: For Supplement

## 2018-01-26 NOTE — DISCHARGE NOTE ADULT - CARE PROVIDER_API CALL
Brodie Vera (DO), Internal Medicine  [address] [facility]  [address] [place] [address]  Phone: [phone]  Fax: [phone] Brodie Vera (DO), Internal Medicine  [address] [place] [address]  Phone: [phone]  Fax: [phone]    Ellen Raines), Psychiatry  [address] [place] [address]  Phone: [phone]  Fax: [phone]    patient's own, urologist  Phone: (   )    -  Fax: (   )    -    neurologist, patient's own  Phone: (   )    -  Fax: (   )    -

## 2018-01-27 VITALS
HEART RATE: 86 BPM | TEMPERATURE: 99 F | RESPIRATION RATE: 18 BRPM | DIASTOLIC BLOOD PRESSURE: 63 MMHG | OXYGEN SATURATION: 94 % | SYSTOLIC BLOOD PRESSURE: 120 MMHG

## 2018-01-27 PROCEDURE — 94640 AIRWAY INHALATION TREATMENT: CPT

## 2018-01-27 PROCEDURE — 99239 HOSP IP/OBS DSCHRG MGMT >30: CPT

## 2018-01-27 PROCEDURE — 71250 CT THORAX DX C-: CPT

## 2018-01-27 PROCEDURE — 87486 CHLMYD PNEUM DNA AMP PROBE: CPT

## 2018-01-27 PROCEDURE — 83605 ASSAY OF LACTIC ACID: CPT

## 2018-01-27 PROCEDURE — 36415 COLL VENOUS BLD VENIPUNCTURE: CPT

## 2018-01-27 PROCEDURE — 84443 ASSAY THYROID STIM HORMONE: CPT

## 2018-01-27 PROCEDURE — 87633 RESP VIRUS 12-25 TARGETS: CPT

## 2018-01-27 PROCEDURE — 99285 EMERGENCY DEPT VISIT HI MDM: CPT

## 2018-01-27 PROCEDURE — 82746 ASSAY OF FOLIC ACID SERUM: CPT

## 2018-01-27 PROCEDURE — 83036 HEMOGLOBIN GLYCOSYLATED A1C: CPT

## 2018-01-27 PROCEDURE — 96361 HYDRATE IV INFUSION ADD-ON: CPT

## 2018-01-27 PROCEDURE — 82378 CARCINOEMBRYONIC ANTIGEN: CPT

## 2018-01-27 PROCEDURE — 71045 X-RAY EXAM CHEST 1 VIEW: CPT

## 2018-01-27 PROCEDURE — 87798 DETECT AGENT NOS DNA AMP: CPT

## 2018-01-27 PROCEDURE — 97161 PT EVAL LOW COMPLEX 20 MIN: CPT

## 2018-01-27 PROCEDURE — 70450 CT HEAD/BRAIN W/O DYE: CPT

## 2018-01-27 PROCEDURE — 97530 THERAPEUTIC ACTIVITIES: CPT

## 2018-01-27 PROCEDURE — 80048 BASIC METABOLIC PNL TOTAL CA: CPT

## 2018-01-27 PROCEDURE — 80053 COMPREHEN METABOLIC PANEL: CPT

## 2018-01-27 PROCEDURE — 87581 M.PNEUMON DNA AMP PROBE: CPT

## 2018-01-27 PROCEDURE — 97116 GAIT TRAINING THERAPY: CPT

## 2018-01-27 PROCEDURE — 81001 URINALYSIS AUTO W/SCOPE: CPT

## 2018-01-27 PROCEDURE — 85027 COMPLETE CBC AUTOMATED: CPT

## 2018-01-27 PROCEDURE — 97110 THERAPEUTIC EXERCISES: CPT

## 2018-01-27 PROCEDURE — 86780 TREPONEMA PALLIDUM: CPT

## 2018-01-27 PROCEDURE — 96365 THER/PROPH/DIAG IV INF INIT: CPT

## 2018-01-27 PROCEDURE — 87400 INFLUENZA A/B EACH AG IA: CPT

## 2018-01-27 PROCEDURE — 83615 LACTATE (LD) (LDH) ENZYME: CPT

## 2018-01-27 PROCEDURE — 93005 ELECTROCARDIOGRAM TRACING: CPT

## 2018-01-27 PROCEDURE — 87086 URINE CULTURE/COLONY COUNT: CPT

## 2018-01-27 PROCEDURE — 82607 VITAMIN B-12: CPT

## 2018-01-27 PROCEDURE — 87040 BLOOD CULTURE FOR BACTERIA: CPT

## 2018-01-27 RX ORDER — TAMSULOSIN HYDROCHLORIDE 0.4 MG/1
1 CAPSULE ORAL
Qty: 7 | Refills: 0 | OUTPATIENT
Start: 2018-01-27

## 2018-01-27 RX ORDER — OFLOXACIN 0.3 %
1 DROPS OPHTHALMIC (EYE)
Qty: 5 | Refills: 0 | OUTPATIENT
Start: 2018-01-27 | End: 2018-01-29

## 2018-01-27 RX ORDER — DOCUSATE SODIUM 100 MG
1 CAPSULE ORAL
Qty: 0 | Refills: 0 | COMMUNITY
Start: 2018-01-27

## 2018-01-27 RX ORDER — POLYETHYLENE GLYCOL 3350 17 G/17G
17 POWDER, FOR SOLUTION ORAL
Qty: 0 | Refills: 0 | COMMUNITY
Start: 2018-01-27

## 2018-01-27 RX ORDER — SENNA PLUS 8.6 MG/1
2 TABLET ORAL
Qty: 0 | Refills: 0 | COMMUNITY
Start: 2018-01-27

## 2018-01-27 RX ORDER — ALBUTEROL 90 UG/1
2 AEROSOL, METERED ORAL
Qty: 18 | Refills: 0 | OUTPATIENT
Start: 2018-01-27 | End: 2018-02-25

## 2018-01-27 RX ADMIN — ESCITALOPRAM OXALATE 15 MILLIGRAM(S): 10 TABLET, FILM COATED ORAL at 08:33

## 2018-01-27 RX ADMIN — Medication 1 MILLIGRAM(S): at 13:28

## 2018-01-27 RX ADMIN — Medication 1 DROP(S): at 13:28

## 2018-01-27 RX ADMIN — Medication 1 TABLET(S): at 08:33

## 2018-01-27 RX ADMIN — HEPARIN SODIUM 5000 UNIT(S): 5000 INJECTION INTRAVENOUS; SUBCUTANEOUS at 05:29

## 2018-01-27 RX ADMIN — Medication 600 MILLIGRAM(S): at 05:29

## 2018-01-27 RX ADMIN — Medication 1 DROP(S): at 00:18

## 2018-01-27 RX ADMIN — Medication 30 MILLIGRAM(S): at 05:29

## 2018-01-27 RX ADMIN — ALBUTEROL 2.5 MILLIGRAM(S): 90 AEROSOL, METERED ORAL at 07:52

## 2018-01-27 RX ADMIN — Medication 100 MILLIGRAM(S): at 05:29

## 2018-01-27 RX ADMIN — Medication 1 TABLET(S): at 13:27

## 2018-01-27 RX ADMIN — Medication 100 MILLIGRAM(S): at 13:28

## 2018-01-27 RX ADMIN — LOSARTAN POTASSIUM 25 MILLIGRAM(S): 100 TABLET, FILM COATED ORAL at 05:29

## 2018-01-27 RX ADMIN — Medication 1 DROP(S): at 05:28

## 2018-01-27 NOTE — PROGRESS NOTE ADULT - PROBLEM SELECTOR PLAN 1
ELY  am labs pending  on IVF  serial labs  supportive care  enc PO intake
Possible Flu/PNA.  UA ok.  Urine culture and Blood culture pending.  Continue with Tamiflu, IV Levaquin, and neb tx.  Pulmonary to follow up
Possible Flu/PNA.  UA ok.  Urine culture pending.  Blood culture negative.    Continue with Tamiflu, IV Levaquin, and neb tx.  Pulmonary to follow up
Possible Flu/PNA.  UA ok.  Urine culture shows less than 25006 urogenital growth.  Blood culture negative.    Continue with Tamiflu, IV Levaquin, and neb tx.  Pulmonary to follow up
Possible Flu/PNA.  UA ok.  Urine culture shows less than 91011 urogenital growth.  Blood culture negative.    Continue with Tamiflu, IV Levaquin, and neb tx.  Pulmonary to follow up

## 2018-01-27 NOTE — PROGRESS NOTE ADULT - PROBLEM SELECTOR PLAN 3
frail  weak  depressed  FTT  supportive care and regimen
Continue with Home medications.  Stable

## 2018-01-27 NOTE — PROGRESS NOTE ADULT - ATTENDING COMMENTS
Plan of care was discussed with patient, and daughter in great details, All questions were answered to their satisfaction.  Seems to understand, and in agreement
Plan of care was discussed with patient, and daughter in great details, All questions were answered to their satisfaction.  Seems to understand, and in agreement
Plan of care was discussed with patient, and daughter in great details, All questions were answered to their satisfaction.  Seems to understand, and in agreement    Discussed case with  for disposition
family agreeable to take patient home.  spent 45 mins on discharge

## 2018-01-27 NOTE — PROGRESS NOTE ADULT - SUBJECTIVE AND OBJECTIVE BOX
Neurology follow up note    NATA [id number]      Interval History:    Patient feels ok no new complaints. seen with AID    MEDICATIONS    ALBUTerol    0.083% 2.5 milliGRAM(s) Nebulizer every 12 hours  ATENolol  Tablet 50 milliGRAM(s) Oral at bedtime  clidinium/chlordiazePOXIDE 1 Capsule(s) Oral three times a day  docusate sodium 100 milliGRAM(s) Oral three times a day  escitalopram 15 milliGRAM(s) Oral daily  folic acid 1 milliGRAM(s) Oral daily  guaiFENesin  milliGRAM(s) Oral every 12 hours  heparin  Injectable 5000 Unit(s) SubCutaneous every 12 hours  influenza   Vaccine 0.5 milliLiter(s) IntraMuscular once  lactobacillus acidophilus 1 Tablet(s) Oral three times a day with meals  levoFLOXacin IVPB 250 milliGRAM(s) IV Intermittent every 24 hours  losartan 25 milliGRAM(s) Oral daily  mirtazapine Soltab 22.5 milliGRAM(s) Oral at bedtime  ondansetron Injectable 4 milliGRAM(s) IV Push every 6 hours PRN  oseltamivir 30 milliGRAM(s) Oral two times a day  polyethylene glycol 3350 17 Gram(s) Oral daily  tamsulosin 0.4 milliGRAM(s) Oral at bedtime      Allergies    aspirin (Hives)  penicillin (Hives)    Intolerances            Vital Signs Last 24 Hrs  T(C): 37.1 (2018 05:10), Max: 38.2 (2018 16:28)  T(F): 98.7 (2018 05:10), Max: 100.8 (2018 16:28)  HR: 80 (2018 07:58) (68 - 91)  BP: 153/79 (2018 05:10) (107/57 - 172/77)  BP(mean): --  RR: 18 (2018 05:10) (18 - 19)  SpO2: 97% (2018 07:58) (92% - 99%)      REVIEW OF SYSTEMS:     Constitutional: No fever, chills, fatigue, weakness  Eyes: no eye pain, visual disturbances, or discharge  ENT:  No difficulty hearing, tinnitus, vertigo; No sinus or throat pain  Neck: No pain or stiffness  Respiratory: No cough, dyspnea, wheezing   Cardiovascular: No chest pain, palpitations,   Gastrointestinal: No abdominal or epigastric pain. No nausea, vomiting  No diarrhea or constipation.   Genitourinary: No dysuria, frequency, hematuria or incontinence  Neurological: No headaches, lightheadedness, vertigo, numbness or tremors  Psychiatric: No depression, anxiety, mood swings or difficulty sleeping  Musculoskeletal: No joint pain or swelling; No muscle, back or extremity pain  Skin: No itching, burning, rashes or lesions   Lymph Nodes: No enlarged glands  Endocrine: No heat or cold intolerance; No hair loss   Allergy and Immunologic: No hives or eczema    On Neurological Examination:    Mental Status - Patient is alert, awake,   [facility] year 2017      Follow simple commands    Speech -   Fluent             On primary gaze, there is a subtle decrease in left nasolabial fold.      Motor:  Bilateral upper 4/5, bilateral lower 3-/5.      Positive tremors were noted in bilateral upper extremities.      Positive cogwheel rigidity was noted.      Positive subtle bradykinesia was noted.    GENERAL Exam: Nontoxic , No Acute Distress   	  HEENT:  normocephalic, atraumatic  		  LUNGS:  Decreased bilaterally  	  HEART: Normal S1S2   No murmur RRR        	  GI/ ABDOMEN:  Soft  Non tender    EXTREMITIES:   No Edema  No Clubbing  No Cyanosis No Edema    MUSCULOSKELETAL: decreased Range of Motion all 4 extremities   	   SKIN: Normal  No Ecchymosis               LABS:  CBC Full  -  ( 2018 07:51 )  WBC Count : 11.2 K/uL  Hemoglobin : 10.6 g/dL  Hematocrit : 31.0 %  Platelet Count - Automated : 181 K/uL  Mean Cell Volume : 93.9 fl  Mean Cell Hemoglobin : 32.0 pg  Mean Cell Hemoglobin Concentration : 34.1 gm/dL  Auto Neutrophil # : x  Auto Lymphocyte # : x  Auto Monocyte # : x  Auto Eosinophil # : x  Auto Basophil # : x  Auto Neutrophil % : x  Auto Lymphocyte % : x  Auto Monocyte % : x  Auto Eosinophil % : x  Auto Basophil % : x    Urinalysis Basic - ( 2018 23:40 )    Color: Yellow / Appearance: Clear / S.020 / pH: x  Gluc: x / Ketone: Negative  / Bili: Negative / Urobili: Negative mg/dL   Blood: x / Protein: 100 mg/dL / Nitrite: Negative   Leuk Esterase: Negative / RBC: 3-5 /HPF / WBC 3-5   Sq Epi: x / Non Sq Epi: Moderate / Bacteria: Few          137  |  104  |  12  ----------------------------<  108<H>  4.0   |  27  |  0.86    Ca    7.6<L>      2018 07:51    TPro  7.2  /  Alb  2.8<L>  /  TBili  0.4  /  DBili  x   /  AST  74<H>  /  ALT  46  /  AlkPhos  36      Hemoglobin A1C:     LIVER FUNCTIONS - ( 2018 13:30 )  Alb: 2.8 g/dL / Pro: 7.2 g/dL / ALK PHOS: 36 U/L / ALT: 46 U/L DA / AST: 74 U/L / GGT: x           Vitamin B12 Vitamin B12, Serum: 1415 pg/mL ( @ 09:31)          RADIOLOGY      ANALYSIS AND PLAN:  This is a 72-year-old with a history of fall and change in mental status, forgetfulness.  1.	For episode of fall possibly secondary to mechanical fall in nature.  2.	I would recommend physical therapy evaluation.  3.	Fall precaution.  4.	For changes in mental status possibly secondary to mild cognitive impairment versus slightly dementia.  5.	As per daughter, there is slight dysarthria, not her baseline speech.  On primary gaze, there is a subtle decrease on left nasolabial fold.  We will check MRI imaging of the brain to rule out cerebrovascular accident.  6.	I spoke with daughter at bedside yesterday  7.	if mri normal cleared only by neurology   8.	We will continue to follow.    Thank you for the courtesy of consultation.    Physical therapy evaluation as tolerated  OOB to chair/ambulation with assistance only if possible.    Greater than 45 minutes spent in direct patient care reviewing  the notes, lab data/ imaging , discussion with multidisciplinary team.
CC.  AMS/Cough    HPI.  Patient appears to be comfortable.  + cough.  No SOB.  Confused.  Unable to obtain ROS    Vital Signs Last 24 Hrs  T(C): 37.1 (2018 05:15), Max: 38.4 (2018 12:15)  T(F): 98.8 (2018 05:15), Max: 101.1 (2018 12:15)  HR: 82 (2018 07:46) (75 - 86)  BP: 172/72 (2018 05:15) (130/55 - 184/83)  BP(mean): 77 (2018 15:42) (77 - 77)  RR: 20 (2018 05:15) (17 - 20)  SpO2: 94% (2018 07:46) (91% - 96%)      PHYSICAL EXAM-  GENERAL: NAD, frail  HEAD:  Atraumatic, Normocephalic  EYES: EOMI, PERRLA, conjunctiva and sclera clear  NECK: Supple, No JVD, Normal thyroid  NERVOUS SYSTEM:  Alert & Oriented X1.  Moving all extremities  CHEST/LUNG: + min rhonchi with decrease air entry.  no retractions or accessory muscle usage  HEART: Regular rate and rhythm; No murmurs, rubs, or gallops  ABDOMEN: Soft, Nontender, Nondistended; Bowel sounds present  SKIN: No rashes or lesions                              10.7   10.4  )-----------( 141      ( 2018 08:07 )             31.4     01-24    138  |  105  |  14  ----------------------------<  133<H>  3.8   |  25  |  0.91    Ca    7.6<L>      2018 08:07    TPro  7.2  /  Alb  2.8<L>  /  TBili  0.4  /  DBili  x   /  AST  74<H>  /  ALT  46  /  AlkPhos  36  -          Urinalysis Basic - ( 2018 23:40 )    Color: Yellow / Appearance: Clear / S.020 / pH: x  Gluc: x / Ketone: Negative  / Bili: Negative / Urobili: Negative mg/dL   Blood: x / Protein: 100 mg/dL / Nitrite: Negative   Leuk Esterase: Negative / RBC: 3-5 /HPF / WBC 3-5   Sq Epi: x / Non Sq Epi: Moderate / Bacteria: Few      MEDICATIONS  (STANDING):  ALBUTerol    0.083% 2.5 milliGRAM(s) Nebulizer every 12 hours  ATENolol  Tablet 50 milliGRAM(s) Oral at bedtime  clidinium/chlordiazePOXIDE 1 Capsule(s) Oral three times a day  docusate sodium 100 milliGRAM(s) Oral three times a day  escitalopram 15 milliGRAM(s) Oral daily  folic acid 1 milliGRAM(s) Oral daily  guaiFENesin  milliGRAM(s) Oral every 12 hours  heparin  Injectable 5000 Unit(s) SubCutaneous every 12 hours  influenza   Vaccine 0.5 milliLiter(s) IntraMuscular once  lactobacillus acidophilus 1 Tablet(s) Oral three times a day with meals  levoFLOXacin IVPB 250 milliGRAM(s) IV Intermittent every 24 hours  losartan 25 milliGRAM(s) Oral daily  mirtazapine Soltab 22.5 milliGRAM(s) Oral at bedtime  oseltamivir 30 milliGRAM(s) Oral two times a day    MEDICATIONS  (PRN):  ondansetron Injectable 4 milliGRAM(s) IV Push every 6 hours PRN Nausea
CC.  AMS/Fever    HPI.  Patient feels better.  Tolerating po intake.  + cough.  Afebrile.  Denies SOB, CP, Abdominal pain, N/V/D.  + generalized weakness    Unable to obtain Complete ROS due to current mentation       Vital Signs Last 24 Hrs  T(C): 37.4 (18 @ 09:00), Max: 38.2 (18 @ 16:28)  T(F): 99.4 (18 @ 09:00), Max: 100.8 (18 @ 16:28)  HR: 82 (18 @ 09:00) (68 - 91)  BP: 139/63 (18 @ 09:00) (107/57 - 172/77)  BP(mean): --  RR: 24 (18 @ 09:00) (18 - 24)  SpO2: 95% (18 @ 09:00) (92% - 99%)        PHYSICAL EXAM-  GENERAL: NAD Chronic ill appearing.  frail  HEAD:  Atraumatic, Normocephalic  EYES: EOMI, PERRLA, conjunctiva and sclera clear  NECK: Supple, No JVD, Normal thyroid  NERVOUS SYSTEM:  Alert & Oriented X1.  moving all extremities.  generalized muscle weakness/atrophy  CHEST/LUNG: Min rhonchi with good air entry.  No retractions or accessory muscle usage  HEART: Regular rate and rhythm; No murmurs, rubs, or gallops  ABDOMEN: Soft, Nontender, Nondistended; Bowel sounds present  EXTREMITIES:  2+ Peripheral Pulses, No clubbing, cyanosis, or edema  SKIN: No rashes or lesions                                  10.6   11.2  )-----------( 181      ( 2018 07:51 )             31.0         137  |  104  |  12  ----------------------------<  108<H>  4.0   |  27  |  0.86    Ca    7.6<L>      2018 07:51    TPro  7.2  /  Alb  2.8<L>  /  TBili  0.4  /  DBili  x   /  AST  74<H>  /  ALT  46  /  AlkPhos  36            Urinalysis Basic - ( 2018 23:40 )    Color: Yellow / Appearance: Clear / S.020 / pH: x  Gluc: x / Ketone: Negative  / Bili: Negative / Urobili: Negative mg/dL   Blood: x / Protein: 100 mg/dL / Nitrite: Negative   Leuk Esterase: Negative / RBC: 3-5 /HPF / WBC 3-5   Sq Epi: x / Non Sq Epi: Moderate / Bacteria: Few              MEDICATIONS  (STANDING):  ALBUTerol    0.083% 2.5 milliGRAM(s) Nebulizer every 12 hours  ATENolol  Tablet 50 milliGRAM(s) Oral at bedtime  clidinium/chlordiazePOXIDE 1 Capsule(s) Oral three times a day  docusate sodium 100 milliGRAM(s) Oral three times a day  escitalopram 15 milliGRAM(s) Oral daily  folic acid 1 milliGRAM(s) Oral daily  guaiFENesin  milliGRAM(s) Oral every 12 hours  heparin  Injectable 5000 Unit(s) SubCutaneous every 12 hours  influenza   Vaccine 0.5 milliLiter(s) IntraMuscular once  lactobacillus acidophilus 1 Tablet(s) Oral three times a day with meals  levoFLOXacin IVPB 250 milliGRAM(s) IV Intermittent every 24 hours  losartan 25 milliGRAM(s) Oral daily  mirtazapine Soltab 22.5 milliGRAM(s) Oral at bedtime  oseltamivir 30 milliGRAM(s) Oral two times a day  polyethylene glycol 3350 17 Gram(s) Oral daily  tamsulosin 0.4 milliGRAM(s) Oral at bedtime    MEDICATIONS  (PRN):  ondansetron Injectable 4 milliGRAM(s) IV Push every 6 hours PRN Nausea          RADIOLOGY RESULTS:
CC.  Cough/AMS  HPI.  Patient reports that she feels better.  Low grade fever noticed overnight.  Amarilis mercado was d/hermelinda this morning.  Cough improving.  Poor historian, unable to obtain ROS    Vital Signs Last 24 Hrs  T(C): 36.7 (26 Jan 2018 09:40), Max: 38.2 (26 Jan 2018 01:26)  T(F): 98 (26 Jan 2018 09:40), Max: 100.7 (26 Jan 2018 01:26)  HR: 77 (26 Jan 2018 09:40) (71 - 87)  BP: 147/69 (26 Jan 2018 09:40) (133/63 - 177/71)  BP(mean): --  RR: 21 (26 Jan 2018 09:40) (20 - 21)  SpO2: 97% (26 Jan 2018 09:40) (96% - 98%)      PHYSICAL EXAM-  GENERAL:  Chronic ill appearing female.  fraile   HEAD:  Atraumatic, Normocephalic  EYES: EOMI, PERRLA, conjunctiva and sclera clear  NECK: Supple, No JVD, Normal thyroid  NERVOUS SYSTEM:  Alert & Oriented X1, Moving all extremities  CHEST/LUNG: Min rhonchi sound.  Good air entry.  No retractions or accessory muscle usage  HEART: Regular rate and rhythm; No murmurs, rubs, or gallops  ABDOMEN: Soft, Nontender, Nondistended; Bowel sounds present  SKIN: No rashes or lesions                              10.0   8.3   )-----------( 225      ( 26 Jan 2018 07:50 )             30.1     01-26    141  |  107  |  14  ----------------------------<  108<H>  4.0   |  29  |  0.89    Ca    7.7<L>      26 Jan 2018 07:50                MEDICATIONS  (STANDING):  ALBUTerol    0.083% 2.5 milliGRAM(s) Nebulizer every 12 hours  ATENolol  Tablet 50 milliGRAM(s) Oral at bedtime  clidinium/chlordiazePOXIDE 1 Capsule(s) Oral three times a day  docusate sodium 100 milliGRAM(s) Oral three times a day  escitalopram 15 milliGRAM(s) Oral daily  folic acid 1 milliGRAM(s) Oral daily  guaiFENesin  milliGRAM(s) Oral every 12 hours  heparin  Injectable 5000 Unit(s) SubCutaneous every 12 hours  influenza   Vaccine 0.5 milliLiter(s) IntraMuscular once  lactobacillus acidophilus 1 Tablet(s) Oral three times a day with meals  levoFLOXacin IVPB 250 milliGRAM(s) IV Intermittent every 24 hours  losartan 25 milliGRAM(s) Oral daily  mirtazapine Soltab 22.5 milliGRAM(s) Oral at bedtime  oseltamivir 30 milliGRAM(s) Oral two times a day  polyethylene glycol 3350 17 Gram(s) Oral daily  tamsulosin 0.4 milliGRAM(s) Oral at bedtime    MEDICATIONS  (PRN):  artificial tears (preservative free) Ophthalmic Solution 1 Drop(s) Both EYES every 8 hours PRN Dry Eyes  ondansetron Injectable 4 milliGRAM(s) IV Push every 6 hours PRN Nausea
Date/Time Patient Seen:  		  Referring MD:   Data Reviewed	       Patient is a 72y old  Female who presents with a chief complaint of Fall/cough (23 Jan 2018 15:42)  in bed  seen and examined  vs and meds reviewed  on ABX  Dtr at BS      Subjective/HPI     PAST MEDICAL & SURGICAL HISTORY:  Essential hypertension  Anxiety  Depression  No significant past surgical history        Medication list         MEDICATIONS  (STANDING):  ALBUTerol    0.083% 2.5 milliGRAM(s) Nebulizer every 12 hours  ATENolol  Tablet 50 milliGRAM(s) Oral at bedtime  clidinium/chlordiazePOXIDE 1 Capsule(s) Oral three times a day  dextrose 5% + sodium chloride 0.45%. 1000 milliLiter(s) (80 mL/Hr) IV Continuous <Continuous>  docusate sodium 100 milliGRAM(s) Oral three times a day  escitalopram 15 milliGRAM(s) Oral daily  folic acid 1 milliGRAM(s) Oral daily  guaiFENesin  milliGRAM(s) Oral every 12 hours  heparin  Injectable 5000 Unit(s) SubCutaneous every 12 hours  influenza   Vaccine 0.5 milliLiter(s) IntraMuscular once  lactobacillus acidophilus 1 Tablet(s) Oral three times a day with meals  levoFLOXacin IVPB 250 milliGRAM(s) IV Intermittent every 24 hours  losartan 25 milliGRAM(s) Oral daily  mirtazapine Soltab 22.5 milliGRAM(s) Oral at bedtime  oseltamivir 30 milliGRAM(s) Oral two times a day    MEDICATIONS  (PRN):  ondansetron Injectable 4 milliGRAM(s) IV Push every 6 hours PRN Nausea         Vitals log        ICU Vital Signs Last 24 Hrs  T(C): 37.1 (24 Jan 2018 05:15), Max: 38.4 (23 Jan 2018 12:15)  T(F): 98.8 (24 Jan 2018 05:15), Max: 101.1 (23 Jan 2018 12:15)  HR: 83 (24 Jan 2018 05:15) (75 - 86)  BP: 172/72 (24 Jan 2018 05:15) (130/55 - 184/83)  BP(mean): 77 (23 Jan 2018 15:42) (77 - 77)  ABP: --  ABP(mean): --  RR: 20 (24 Jan 2018 05:15) (17 - 20)  SpO2: 91% (24 Jan 2018 05:15) (91% - 96%)           Input and Output:  I&O's Detail    23 Jan 2018 07:01  -  24 Jan 2018 06:44  --------------------------------------------------------  IN:    dextrose 5% + sodium chloride 0.45%.: 960 mL  Total IN: 960 mL    OUT:  Total OUT: 0 mL    Total NET: 960 mL          Lab Data                        11.0   12.0  )-----------( 151      ( 23 Jan 2018 13:30 )             32.7     01-23    140  |  104  |  28<H>  ----------------------------<  141<H>  4.1   |  30  |  1.36<H>    Ca    8.2<L>      23 Jan 2018 13:30    TPro  7.2  /  Alb  2.8<L>  /  TBili  0.4  /  DBili  x   /  AST  74<H>  /  ALT  46  /  AlkPhos  36  01-23            Review of Systems	      Objective     Physical Examination    head at  heart s1s2  lungs dec BS  abd soft      Pertinent Lab findings & Imaging      Amarilis:  NO   Adequate UO     I&O's Detail    23 Jan 2018 07:01  -  24 Jan 2018 06:44  --------------------------------------------------------  IN:    dextrose 5% + sodium chloride 0.45%.: 960 mL  Total IN: 960 mL    OUT:  Total OUT: 0 mL    Total NET: 960 mL               Discussed with:     Cultures:	        Radiology
Neurology follow up note    NATA [id number]      Interval History:    Patient seen with daughter sleeping  in bed doing well     MEDICATIONS    ALBUTerol    0.083% 2.5 milliGRAM(s) Nebulizer every 12 hours  artificial tears (preservative free) Ophthalmic Solution 1 Drop(s) Both EYES every 8 hours PRN  ATENolol  Tablet 50 milliGRAM(s) Oral at bedtime  clidinium/chlordiazePOXIDE 1 Capsule(s) Oral three times a day  docusate sodium 100 milliGRAM(s) Oral three times a day  escitalopram 15 milliGRAM(s) Oral daily  folic acid 1 milliGRAM(s) Oral daily  guaiFENesin  milliGRAM(s) Oral every 12 hours  heparin  Injectable 5000 Unit(s) SubCutaneous every 12 hours  influenza   Vaccine 0.5 milliLiter(s) IntraMuscular once  lactobacillus acidophilus 1 Tablet(s) Oral three times a day with meals  levoFLOXacin IVPB 250 milliGRAM(s) IV Intermittent every 24 hours  losartan 25 milliGRAM(s) Oral daily  mirtazapine Soltab 22.5 milliGRAM(s) Oral at bedtime  ondansetron Injectable 4 milliGRAM(s) IV Push every 6 hours PRN  oseltamivir 30 milliGRAM(s) Oral two times a day  polyethylene glycol 3350 17 Gram(s) Oral daily  tamsulosin 0.4 milliGRAM(s) Oral at bedtime      Allergies    aspirin (Hives)  penicillin (Hives)    Intolerances            Vital Signs Last 24 Hrs  T(C): 36.7 (26 Jan 2018 05:12), Max: 38.2 (26 Jan 2018 01:26)  T(F): 98.1 (26 Jan 2018 05:12), Max: 100.7 (26 Jan 2018 01:26)  HR: 76 (26 Jan 2018 07:55) (71 - 87)  BP: 133/63 (26 Jan 2018 05:12) (133/63 - 177/71)  BP(mean): --  RR: 20 (26 Jan 2018 05:12) (20 - 24)  SpO2: 96% (26 Jan 2018 07:55) (95% - 98%)      REVIEW OF SYSTEMS: sleeping in bed -- no new events as per daughter       On Neurological Examination:    Mental Status - Patient is sleeping in bed       Speech -   Fluent      Cranial Nerves - Pupils 3 mm equal and reactive to light,   extraocular eye movements intact.   smile symmetric  intact bilateral NLF    Motor Exam -   no change as per family       Muscle tone - is normal all over.  No asymmetry is seen.        GENERAL Exam: Nontoxic , No Acute Distress               LABS:  CBC Full  -  ( 25 Jan 2018 07:51 )  WBC Count : 11.2 K/uL  Hemoglobin : 10.6 g/dL  Hematocrit : 31.0 %  Platelet Count - Automated : 181 K/uL  Mean Cell Volume : 93.9 fl  Mean Cell Hemoglobin : 32.0 pg  Mean Cell Hemoglobin Concentration : 34.1 gm/dL  Auto Neutrophil # : x  Auto Lymphocyte # : x  Auto Monocyte # : x  Auto Eosinophil # : x  Auto Basophil # : x  Auto Neutrophil % : x  Auto Lymphocyte % : x  Auto Monocyte % : x  Auto Eosinophil % : x  Auto Basophil % : x      01-26    141  |  107  |  14  ----------------------------<  108<H>  4.0   |  29  |  0.89    Ca    7.7<L>      26 Jan 2018 07:50      Hemoglobin A1C:       Vitamin B12 Vitamin B12, Serum: 1792 pg/mL (01-25 @ 10:43)          RADIOLOGY    ANALYSIS AND PLAN:  This is a 72-year-old with a history of fall and change in mental status, forgetfulness.  1.	For episode of fall possibly secondary to mechanical fall in nature.  2.	I would recommend physical therapy evaluation.  3.	Fall precaution.  4.	For changes in mental status possibly secondary to mild cognitive impairment versus slightly dementia.  5.	As per daughter, there is slight dysarthria, not her baseline speech.  On primary gaze, there is a subtle decrease on left nasolabial fold.  We will check MRI imaging of the brain to rule out cerebrovascular accident.  6.	I spoke with daughter at bedside  no new events 1/267/18  7.	 mri normal cleared only by neurology   8.	We will continue to follow.    Thank you for the courtesy of consultation.    Physical therapy evaluation as tolerated  OOB to chair/ambulation with assistance only if possible.    Greater than 40 minutes spent in direct patient care reviewing  the notes, lab data/ imaging , discussion with multidisciplinary team.
Patient is a 72y old  Female who presents with a chief complaint of Fall/cough (26 Jan 2018 20:31)      INTERVAL History of Present Illness/OVERNIGHT EVENTS: no new issues. comfortable.  daughters at bedside - very well aware of medical conditions including lung nodules.  per daughter, pt would not want treatment if she has lung cancer ... family will discuss more in the coming days amongst themselves about lung biopsy etc.    MEDICATIONS  (STANDING):  ALBUTerol    0.083% 2.5 milliGRAM(s) Nebulizer every 12 hours  ATENolol  Tablet 50 milliGRAM(s) Oral at bedtime  clidinium/chlordiazePOXIDE 1 Capsule(s) Oral three times a day  docusate sodium 100 milliGRAM(s) Oral three times a day  escitalopram 15 milliGRAM(s) Oral daily  folic acid 1 milliGRAM(s) Oral daily  guaiFENesin  milliGRAM(s) Oral every 12 hours  heparin  Injectable 5000 Unit(s) SubCutaneous every 12 hours  influenza   Vaccine 0.5 milliLiter(s) IntraMuscular once  lactobacillus acidophilus 1 Tablet(s) Oral three times a day with meals  levoFLOXacin IVPB 250 milliGRAM(s) IV Intermittent every 24 hours  losartan 25 milliGRAM(s) Oral daily  mirtazapine Soltab 22.5 milliGRAM(s) Oral at bedtime  ofloxacin 0.3% Solution 1 Drop(s) Both EYES four times a day  oseltamivir 30 milliGRAM(s) Oral two times a day  polyethylene glycol 3350 17 Gram(s) Oral daily  senna 2 Tablet(s) Oral at bedtime  tamsulosin 0.4 milliGRAM(s) Oral at bedtime    MEDICATIONS  (PRN):  artificial tears (preservative free) Ophthalmic Solution 1 Drop(s) Both EYES every 8 hours PRN Dry Eyes  ondansetron Injectable 4 milliGRAM(s) IV Push every 6 hours PRN Nausea      Allergies    aspirin (Hives)  penicillin (Hives)    Intolerances        REVIEW OF SYSTEMS:  Negative unless otherwise specified above.    Vital Signs Last 24 Hrs  T(C): 37.3 (27 Jan 2018 09:07), Max: 37.3 (27 Jan 2018 09:07)  T(F): 99.2 (27 Jan 2018 09:07), Max: 99.2 (27 Jan 2018 09:07)  HR: 86 (27 Jan 2018 09:07) (77 - 89)  BP: 120/63 (27 Jan 2018 09:07) (120/63 - 174/81)  BP(mean): --  RR: 18 (27 Jan 2018 09:07) (18 - 20)  SpO2: 94% (27 Jan 2018 09:07) (94% - 98%)        PHYSICAL EXAM:  GENERAL: No apparent distress, slow speech  HEAD:  Atraumatic, Normocephalic  EYES: conjunctiva and sclera clear  ENMT: Moist mucous membranes  NECK: Supple  CHEST/LUNG: scattered rhonchi  HEART: Regular rate and rhythm  ABDOMEN: Soft, Nontender, Nondistended; Bowel sounds present  EXTREMITIES:  No clubbing, cyanosis or edema  SKIN: No rashes or lesions  NERVOUS SYSTEM:  Alert & Oriented X3; Bilateral Lower extremity mobile, sensation to light touch intact      LABS:      Ca    7.7        26 Jan 2018 07:50          CAPILLARY BLOOD GLUCOSE          RADIOLOGY & ADDITIONAL TESTS:    Images reviewed personally    Consultant Notes Reviewed and Care Discussed with relevant Consultants/Other Providers.

## 2018-01-27 NOTE — PROGRESS NOTE ADULT - PROBLEM SELECTOR PROBLEM 2
Emphysema
Essential hypertension

## 2018-01-27 NOTE — PROGRESS NOTE ADULT - PROBLEM SELECTOR PLAN 2
COPD  emphysema  CT Chest reviewed  on NEBS bid  cont to monitor sat  keep sat > 88 pct
Continue with atenolol, and hold losartan.  Monitor BP

## 2018-01-28 LAB
CULTURE RESULTS: SIGNIFICANT CHANGE UP
CULTURE RESULTS: SIGNIFICANT CHANGE UP
SPECIMEN SOURCE: SIGNIFICANT CHANGE UP
SPECIMEN SOURCE: SIGNIFICANT CHANGE UP

## 2019-06-15 NOTE — SWALLOW BEDSIDE ASSESSMENT ADULT - ORAL PHASE
Consult received. Will see the patient soon.     Thanks  Nephrology  Esau Yoo [address] [facility], [address] [place] [address]  Office: [phone]  Cell: [phone]  Fax: [phone] Within functional limits

## 2022-04-08 NOTE — H&P ADULT - MINUTES
71 For information on Fall & Injury Prevention, visit: [email or web address] OR  [email or web address] OR  https://www.cdc.gov/steadi/patient.html

## 2023-01-06 NOTE — CONSULT NOTE ADULT - NSPROBSELRECBLANK_5_GEN
[place] sleep scale as above.  Total score 18.  High risk of having sleep apnea.  
DISPLAY PLAN FREE TEXT